# Patient Record
Sex: MALE | Race: WHITE | URBAN - METROPOLITAN AREA
[De-identification: names, ages, dates, MRNs, and addresses within clinical notes are randomized per-mention and may not be internally consistent; named-entity substitution may affect disease eponyms.]

---

## 2022-03-04 ENCOUNTER — OFFICE VISIT (OUTPATIENT)
Dept: ORTHOPEDIC SURGERY | Age: 22
End: 2022-03-04
Payer: COMMERCIAL

## 2022-03-04 VITALS — BODY MASS INDEX: 22.4 KG/M2 | HEIGHT: 71 IN | WEIGHT: 160 LBS | RESPIRATION RATE: 16 BRPM

## 2022-03-04 DIAGNOSIS — M79.661 PAIN IN RIGHT LOWER LEG: Primary | ICD-10-CM

## 2022-03-04 PROCEDURE — 99203 OFFICE O/P NEW LOW 30 MIN: CPT | Performed by: PHYSICIAN ASSISTANT

## 2022-03-04 NOTE — PROGRESS NOTES
Chief Complaint    Leg Pain (R shin following a bowling accident 6 days ago)      History of Present Illness:  Mannie Del Rio is a 25 y.o. male who presents today after Hours clinic tonight for evaluation of right anterior shin pain. Patient states that he sustained an injury to the right shin approximately 6 days ago while bowling. He states that he was finishing a game and decided to take his shoes off and threw the ball and slipped and fell into the side of the [employer] impacting the right shin into the metal frame. He sustained a abrasion over the medial tibial plateau. He is presently complaining of pain over the medial proximal one third of the tibia. He is ambulating with a limp and has had no previous injuries to the right tibia  Pain Assessment  Location of Pain: Leg  Location Modifiers: Right  Severity of Pain: 3  Quality of Pain: Other (Comment)  Duration of Pain: Persistent  Frequency of Pain: Several times daily  Date Pain First Started: 02/26/22  Aggravating Factors: Walking  Limiting Behavior: Some  Relieving Factors: Rest  Result of Injury: Yes  Work-Related Injury: No  Are there other pain locations you wish to document?: No]    Medical History:  Patient's medications, allergies, past medical, surgical, social and family histories were reviewed and updated as appropriate. Review of Systems:  Pertinent items are noted in HPI  Review of systems reviewed from Patient History Form dated on 3/4/2022 and available in the patient's chart under the Media tab. Vital Signs: There were no vitals taken for this visit. General Exam:   Constitutional: Patient is adequately groomed with no evidence of malnutrition  Mental Status: The patient is oriented to time, place and person. The patient's mood and affect are appropriate. Neurological: The patient has good coordination. There is no weakness or sensory deficit.     Right lower Leg Examination:    Inspection: Today's inspection of the right proximal tibia reveals an area of ecchymosis and swelling with a healing abrasion. There is no surrounding erythema or induration. Palpation: Patient is exquisitely tender to palpation over the proximal one third of the tibia medially over the area of the small hematoma. Range of Motion: Has full range of motion of the knee and ankle without pain    Strength: There are no strength deficits noted upon testing of the quadriceps, and hamstrings    Special Tests: Distal neurovascular is grossly intact    Skin: There are no rashes, ulcerations or lesions. Gait: Ambulation is with a slight limp secondary to pain    Distal neurovascular is grossly intact    Capillary refill is within 2 seconds    Radiology:     X-rays obtained and reviewed in office:  Views AP and lateral of the tibia and fibula  Location right tibia and fibula  Impression there is a very small area over the medial aspect of the proximal tibia that was seen only on the lateral which could possibly represent a small fracture. This was not visualized on the AP. Assessment : Contusion right tibia    Impression:  Encounter Diagnosis   Name Primary?  Pain in right lower leg Yes       Office Procedures:  Orders Placed This Encounter   Procedures    XR TIBIA FIBULA RIGHT (2 VIEWS)     Standing Status:   Future     Number of Occurrences:   1     Standing Expiration Date:   4/4/2022       Treatment Plan: Today we discussed conservative treatments to continue with stretching and progressive strengthening along with heat alternating with ice to help break up the small hematoma. He was instructed that this may take several weeks to fully resolve. He may continue to be weightbearing as tolerated and activity as tolerated.     Follow-up: As needed with Dr. Sudarshan Hanna